# Patient Record
Sex: FEMALE | Race: WHITE | NOT HISPANIC OR LATINO | URBAN - METROPOLITAN AREA
[De-identification: names, ages, dates, MRNs, and addresses within clinical notes are randomized per-mention and may not be internally consistent; named-entity substitution may affect disease eponyms.]

---

## 2017-03-24 ENCOUNTER — TELEPHONE (OUTPATIENT)
Dept: FAMILY MEDICINE | Facility: CLINIC | Age: 57
End: 2017-03-24

## 2017-03-24 NOTE — LETTER
[facility]  [address] [facility] [address] [phone]          April 10, 2017    Mariana Feliz  [address] [facility] [address]    Dear Mariana,        Our records indicate that you have not scheduled for a(n)mammogram which was recommended by your health care team. Monitoring and managing your preventative and chronic health conditions are very important to us.       If you have received your health care elsewhere, please provide us with that information so it can be documented in your chart.    Please call [phone] or message us through your "[employer]" account to schedule an appointment or provide information for your chart.     I look forward to seeing you and working with you on your health care needs.     Sincerely,       YUSUF Oconnell /bautista        *If you have already scheduled an appointment, please disregard this reminder

## 2017-04-20 ENCOUNTER — RADIANT APPOINTMENT (OUTPATIENT)
Dept: GENERAL RADIOLOGY | Facility: CLINIC | Age: 57
End: 2017-04-20
Attending: FAMILY MEDICINE
Payer: COMMERCIAL

## 2017-04-20 ENCOUNTER — OFFICE VISIT (OUTPATIENT)
Dept: FAMILY MEDICINE | Facility: CLINIC | Age: 57
End: 2017-04-20
Payer: COMMERCIAL

## 2017-04-20 VITALS
HEART RATE: 83 BPM | BODY MASS INDEX: 21.46 KG/M2 | WEIGHT: 125 LBS | TEMPERATURE: 97.4 F | RESPIRATION RATE: 16 BRPM | OXYGEN SATURATION: 100 % | SYSTOLIC BLOOD PRESSURE: 131 MMHG | DIASTOLIC BLOOD PRESSURE: 78 MMHG

## 2017-04-20 DIAGNOSIS — M79.644 PAIN OF RIGHT THUMB: Primary | ICD-10-CM

## 2017-04-20 PROCEDURE — 73140 X-RAY EXAM OF FINGER(S): CPT | Mod: RT

## 2017-04-20 PROCEDURE — 99213 OFFICE O/P EST LOW 20 MIN: CPT | Performed by: FAMILY MEDICINE

## 2017-04-20 RX ORDER — IBUPROFEN 200 MG
200 TABLET ORAL EVERY 4 HOURS PRN
COMMUNITY
End: 2017-04-28

## 2017-04-20 ASSESSMENT — PAIN SCALES - GENERAL: PAINLEVEL: EXTREME PAIN (8)

## 2017-04-20 NOTE — MR AVS SNAPSHOT
After Visit Summary   4/20/2017    Mariana Feliz    MRN: [id number]           Patient Information     Date Of Birth          1960        Visit Information        Provider Department      4/20/2017 3:20 PM Beth Payne MD [facility]        Today's Diagnoses     Pain of right thumb    -  1       Follow-ups after your visit        Who to contact     If you have questions or need follow up information about today's clinic visit or your schedule please contact [facility] directly at [phone].  Normal or non-critical lab and imaging results will be communicated to you by MyChart, letter or phone within 4 business days after the clinic has received the results. If you do not hear from us within 7 days, please contact the clinic through Humedic[employer] or phone. If you have a critical or abnormal lab result, we will notify you by phone as soon as possible.  Submit refill requests through [employer] or call your pharmacy and they will forward the refill request to us. Please allow 3 business days for your refill to be completed.          Additional Information About Your Visit        MyChart Information     [employer] gives you secure access to your electronic health record. If you see a primary care provider, you can also send messages to your care team and make appointments. If you have questions, please call your primary care clinic.  If you do not have a primary care provider, please call [phone] and they will assist you.        Care EveryWhere ID     This is your Care EveryWhere ID. This could be used by other organizations to access your [place] medical records  [id number]        Your Vitals Were     Pulse Temperature Respirations Pulse Oximetry Breastfeeding? BMI (Body Mass Index)    83 97.4  F (36.3  C) (Oral) 16 100% No 21.46 kg/m2       Blood Pressure from Last 3 Encounters:   04/20/17 131/78   09/29/16 122/70   12/21/15 147/81    Weight from Last 3  Encounters:   04/20/17 125 lb (56.7 kg)   09/29/16 126 lb (57.2 kg)   12/21/15 123 lb (55.8 kg)              We Performed the Following     XR Finger Right G/E 2 Views        Primary Care Provider Office Phone # Fax #    Nilson Figueroa [phone]       [facility] [address] [facility] [address]        Thank you!     Thank you for choosing [facility]  for your care. Our goal is always to provide you with excellent care. Hearing back from our patients is one way we can continue to improve our services. Please take a few minutes to complete the written survey that you may receive in the mail after your visit with us. Thank you!             Your Updated Medication List - Protect others around you: Learn how to safely use, store and throw away your medicines at www.disposemymeds.org.          This list is accurate as of: 4/20/17  3:20 PM.  Always use your most recent med list.                   Brand Name Dispense Instructions for use    ibuprofen 200 MG tablet    ADVIL/MOTRIN     Take 200 mg by mouth every 4 hours as needed for mild pain       NO ACTIVE MEDICATIONS      .

## 2017-04-20 NOTE — LETTER
[facility]  [address] [facility] [address]  Phone: [phone]    April 20, 2017        Mariana Feliz  [address] [facility] [address]          To whom it may concern:    RE: Mariana Feliz    Patient was seen and treated today at our clinic and missed work.  Recommend left handed work only for a week.  April 28 may resume full duty if pain is resolved. If pain persists need re-evaluation.     Please contact me for questions or concerns.      Sincerely,        Beth Payne MD

## 2017-04-20 NOTE — PROGRESS NOTES
SUBJECTIVE:                                                    Mariana Feliz is a 57 year old female who presents to clinic today for the following health issues:      Musculoskeletal problem/pain      Duration: 1 week    Description  Location: right thumb     Intensity:  severe    Accompanying signs and symptoms: numbness and swelling    History  Previous similar problem: no   Previous evaluation:  none    Precipitating or alleviating factors:  Trauma or overuse: YES- overuse?  Aggravating factors include: overuse    Therapies tried and outcome: Ibuprofen   Recently lost mom a month ago. Coping ok with grief. No thoughts of harming self or others   Good social support.   A week ago thought it was a little tender  However past few days patient works in [employer] and was using it  A lot  3 days ago patient tried using a splint hasn't helped much  When patient  or button her pants would have shooting pain in her right base of the thumb up to medial wrist  Feels tight and swelling  No injuries  No recent fall  Denies any history of ulcers or kidney disease or any known contraindication to NSAIDs    Problem list and histories reviewed & adjusted, as indicated.  Additional history: as documented    Problem list, Medication list, Allergies, and Medical/Social/Surgical histories reviewed in EPIC and updated as appropriate.    ROS:  Constitutional, HEENT, cardiovascular, pulmonary, gi and gu systems are negative, except as otherwise noted.    OBJECTIVE:                                                    /78  Pulse 83  Temp 97.4  F (36.3  C) (Oral)  Resp 16  Wt 125 lb (56.7 kg)  SpO2 100%  Breastfeeding? No  BMI 21.46 kg/m2  Body mass index is 21.46 kg/(m^2).  Awake alert not in any acute cardiorespiratory distress  Psych: pleasant   Neurologic: No gross neurologic deficits  Skin: no obvious lesions or rashes  Musculoskeletal:   Affected extremity neurovascularly intact, no cyanosis or edema, good  pulses and capillary refill.   Some bony prominence on right thumb mcp joint area where she is tender and increased pain with range of motion.  No other bony pain or tenderness nontender scaphoid. Negative Finkelstein test.     Diagnostic Test Results:  Results for orders placed or performed in visit on 04/20/17 (from the past 24 hour(s))   XR Finger Right G/E 2 Views    Narrative    RIGHT FINGER TWO OR MORE VIEWS  4/20/2017 3:35 PM      HISTORY: Pain in right finger(s).     COMPARISON: None.      Impression    IMPRESSION: Three views of the right thumb. No acute fracture or  dislocation.    CHEMA PUGA MD        ASSESSMENT/PLAN:                                                        ICD-10-CM    1. Pain of right thumb M79.644 XR Finger Right G/E 2 Views     xrays were reviewed by me as well.   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   Patient was given thumb spica splint  Activity modifications discussed  Recommend follow up with primary care provider if no relief, sooner if worse  Adverse reactions of medications discussed.  Over the counter medications discussed.   Aware to come back in if with worsening symptoms or if no relief despite treatment plan  Patient voiced understanding and had no further questions.     MD Beth Savage MD  [facility]

## 2017-04-27 ENCOUNTER — TELEPHONE (OUTPATIENT)
Dept: FAMILY MEDICINE | Facility: CLINIC | Age: 57
End: 2017-04-27

## 2017-04-28 ENCOUNTER — OFFICE VISIT (OUTPATIENT)
Dept: FAMILY MEDICINE | Facility: CLINIC | Age: 57
End: 2017-04-28
Payer: COMMERCIAL

## 2017-04-28 VITALS
TEMPERATURE: 97.3 F | HEART RATE: 88 BPM | SYSTOLIC BLOOD PRESSURE: 135 MMHG | DIASTOLIC BLOOD PRESSURE: 86 MMHG | BODY MASS INDEX: 21.28 KG/M2 | WEIGHT: 124 LBS

## 2017-04-28 DIAGNOSIS — M18.9 ARTHROSIS OF FIRST CARPOMETACARPAL JOINT: Primary | ICD-10-CM

## 2017-04-28 PROCEDURE — 99213 OFFICE O/P EST LOW 20 MIN: CPT | Performed by: FAMILY MEDICINE

## 2017-04-28 NOTE — NURSING NOTE
"Chief Complaint   Patient presents with     Pain     right thumb x 2 weeks did see Elmer       Initial /88 (BP Location: Left arm, Cuff Size: Adult Regular)  Pulse 88  Temp 97.3  F (36.3  C) (Oral)  Wt 124 lb (56.2 kg)  BMI 21.28 kg/m2 Estimated body mass index is 21.28 kg/(m^2) as calculated from the following:    Height as of 9/29/16: 5' 4\" (1.626 m).    Weight as of this encounter: 124 lb (56.2 kg).  Medication Reconciliation: complete   Hayde Rosas M.A.      "

## 2017-04-28 NOTE — MR AVS SNAPSHOT
After Visit Summary   4/28/2017    Mariana Feliz    MRN: [id number]           Patient Information     Date Of Birth          1960        Visit Information        Provider Department      4/28/2017 9:30 AM Nilson Figueroa MD [facility]        Today's Diagnoses     Arthrosis of first carpometacarpal joint    -  1      Care Instructions    Your provider has referred you to: G: [facility] [phone]   [email or web address]/    Dr Shelbie Fine in Hand Surgery        Follow-ups after your visit        Additional Services     ORTHOPEDICS ADULT REFERRAL       Your provider has referred you to: FMG: [facility] [phone]   [email or web address]/    Please be aware that coverage of these services is subject to the terms and limitations of your health insurance plan.  Call member services at your health plan with any benefit or coverage questions.      Please bring the following to your appointment:    >>   Any x-rays, CTs or MRIs which have been performed.  Contact the facility where they were done to arrange for  prior to your scheduled appointment.    >>   List of current medications   >>   This referral request   >>   Any documents/labs given to you for this referral                  Your next 10 appointments already scheduled     May 25, 2017 10:00 AM CDT   MA SCREENING DIGITAL BILATERAL with ANDMA1   [facility] ([facility])    [address] [facility] [address] [phone]           Do not use any powder, lotion or deodorant under your arms or on your breast. If you do, we will ask you to remove it before your exam.  Wear comfortable, two-piece clothing.  If you have any  allergies, tell your care team.  Bring any previous mammograms from other facilities or have them mailed to the breast center.            Cruzito 15, 2017  9:20 AM CDT   PHYSICAL with Shelbie Merrill PA-C   [facility] ([facility]    [address] [facility] [address] [phone]              Who to contact     If you have questions or need follow up information about today's clinic visit or your schedule please contact [facility] directly at [phone].  Normal or non-critical lab and imaging results will be communicated to you by P[employer]apehart, letter or phone within 4 business days after the clinic has received the results. If you do not hear from us within 7 days, please contact the clinic through [employer] or phone. If you have a critical or abnormal lab result, we will notify you by phone as soon as possible.  Submit refill requests through [employer] or call your pharmacy and they will forward the refill request to us. Please allow 3 business days for your refill to be completed.          Additional Information About Your Visit        Pinshapehart Information     [employer] gives you secure access to your electronic health record. If you see a primary care provider, you can also send messages to your care team and make appointments. If you have questions, please call your primary care clinic.  If you do not have a primary care provider, please call [phone] and they will assist you.        Care EveryWhere ID     This is your Care EveryWhere ID. This could be used by other organizations to access your [place] medical records  [id number]        Your Vitals Were     Pulse Temperature BMI (Body Mass Index)             88 97.3  F (36.3  C) (Oral) 21.28 kg/m2          Blood Pressure from Last 3 Encounters:   04/28/17 143/88   04/20/17 131/78   09/29/16 122/70    Weight from Last 3 Encounters:   04/28/17 124 lb (56.2 kg)   04/20/17 125 lb (56.7 kg)   09/29/16 126 lb  (57.2 kg)              We Performed the Following     ORTHOPEDICS ADULT REFERRAL        Primary Care Provider Office Phone # Fax #    Nilson Figueroa [phone]       [facility] [address] [facility] [address]        Thank you!     Thank you for choosing [facility]  for your care. Our goal is always to provide you with excellent care. Hearing back from our patients is one way we can continue to improve our services. Please take a few minutes to complete the written survey that you may receive in the mail after your visit with us. Thank you!             Your Updated Medication List - Protect others around you: Learn how to safely use, store and throw away your medicines at www.disposemymeds.org.          This list is accurate as of: 4/28/17 10:02 AM.  Always use your most recent med list.                   Brand Name Dispense Instructions for use    NO ACTIVE MEDICATIONS      .

## 2017-04-28 NOTE — TELEPHONE ENCOUNTER
Notified Hayde received forms for patient and placed in provider in basket. Provider please see forms./Stefani Lewis,

## 2017-04-28 NOTE — TELEPHONE ENCOUNTER
Hayde from short term is calling to see if pcp has received the forms for patient, she stated she will refax this to pcp and requesting this be faxed back asap  Thank you

## 2017-04-28 NOTE — PATIENT INSTRUCTIONS
Your provider has referred you to: FMG: [facility] [phone]   [email or web address]    Dr Shelbie Fine in Hand Surgery

## 2017-04-28 NOTE — PROGRESS NOTES
SUBJECTIVE:  Mariana Feliz is a 57 year old female who is seen in follow-up for  right thumb pain that started   2-3  weeks ago. The onset of the pain was  gradual.    The pain was first noticed while the patient was sometime after she had to clean her mothers apartment and box up her stuff after she passed away.   She is right handed    The patient reports that these symptoms have been waxing and waning since that time  Palliative factors for the pain include:  Rest   Provacative factors include: closing the hand with te thumb against the 1st finger.     The patient relates no prior history of problems in this hand.  The patient reports that she is employed as a  at [employer] and does have a physical job that demands use of the affected hand.  The patient reports that she does not play sports .  The patients past medical, surgical, social and family histories were reviewed.  Social History     Social History     Marital status:      Spouse name: N/A     Number of children: N/A     Years of education: N/A     Social History Main Topics     Smoking status: Current Every Day Smoker     Packs/day: 1.00     Types: Cigarettes     Smokeless tobacco: Never Used     Alcohol use No     Drug use: No     Sexual activity: Yes     Partners: Male     Birth control/ protection: None     Other Topics Concern     Parent/Sibling W/ Cabg, Mi Or Angioplasty Before 65f 55m? No     Social History Narrative         REVIEW OF SYSTEMS:  CONSTITUTIONAL:NEGATIVE for fever, chills, change in weight  I    OBJECTIVE:  /86  Pulse 88  Temp 97.3  F (36.3  C) (Oral)  Wt 124 lb (56.2 kg)  BMI 21.28 kg/m2    RIGHT HAND EXAM:  GENERAL APPEARANCE: healthy, alert and no distress  SKIN: no suspicious lesions or rashes  NEURO: Normal strength and tone, mentation intact and speech normal  PSYCH:  mentation appears normal and affect normal/bright    Inspection:Thumb:  Prominence at the 1stCMC joint  Tender: Thumb:    CMC  Non-tender: the 1st dorsal compartment    Strength: full strength  Special tests: negative bobestein            ASSESSMENT/PLAN  (M18.9) Arthrosis of first carpometacarpal joint  (primary encounter diagnosis)  Comment:   Plan: ORTHOPEDICS ADULT REFERRAL            She will get me some forms for her work.

## 2017-05-01 ENCOUNTER — MYC MEDICAL ADVICE (OUTPATIENT)
Dept: FAMILY MEDICINE | Facility: CLINIC | Age: 57
End: 2017-05-01

## 2017-05-01 ENCOUNTER — TELEPHONE (OUTPATIENT)
Dept: FAMILY MEDICINE | Facility: CLINIC | Age: 57
End: 2017-05-01

## 2017-05-01 NOTE — TELEPHONE ENCOUNTER
Form faxed with the cover sheet patient dropped off. Faxed to [phone].  Marilin Anderson cma

## 2017-05-01 NOTE — TELEPHONE ENCOUNTER
Patient dropped of note at the .    I called to make an appointment with Shelbie Fine at [place]. The earliest she could see me was June 19th.  I scheduled it, but was wondering if you knew of another hand surgeon who was good at this. If so, message me in the my chart.  Thanks,  Mariana Feliz

## 2017-05-04 ENCOUNTER — MYC MEDICAL ADVICE (OUTPATIENT)
Dept: FAMILY MEDICINE | Facility: CLINIC | Age: 57
End: 2017-05-04

## 2017-05-08 ENCOUNTER — TELEPHONE (OUTPATIENT)
Dept: FAMILY MEDICINE | Facility: CLINIC | Age: 57
End: 2017-05-08

## 2017-05-08 NOTE — TELEPHONE ENCOUNTER
Reason for Call:  Form, our goal is to have forms completed with 72 hours, however, some forms may require a visit or additional information.    Type of letter, form or note:  disability    Who is the form from?: Patient    Where did the form come from: Patient or family brought in       What clinic location was the form placed at?: [place]    Where the form was placed: 's Box    What number is listed as a contact on the form?: [phone]       Additional comments: HIGH PRIORITY- please call the patient as soon as this has been done for   Call taken on 5/8/2017 at 8:37 AM by Niurka Humphrey

## 2017-05-08 NOTE — PROGRESS NOTES
Answers for HPI/ROS submitted by the patient on 6/8/2017   Annual Exam:  Getting at least 3 servings of Calcium per day:: NO  Bi-annual eye exam:: NO  Dental care twice a year:: NO  Sleep apnea or symptoms of sleep apnea:: None  Diet:: Regular (no restrictions)  Frequency of exercise:: 1 day/week  Taking medications regularly:: Not Applicable  Medication side effects:: Not applicable  Additional concerns today:: No  PHQ-2 Score: 3  Duration of exercise:: 15-30 minutes  If you checked off any problems, how difficult have these problems made it for you to do your work, take care of things at home, or get along with other people?: Not difficult at all  PHQ9 TOTAL SCORE: 5     SUBJECTIVE:     CC: Mariana Feliz is an 57 year old woman who presents for preventive health visit.     Healthy Habits:    Do you get at least three servings of calcium containing foods daily (dairy, green leafy vegetables, etc.)? No    Amount of exercise or daily activities, outside of work: 1 day(s) per week    Problems taking medications regularly No    Medication side effects: No    Have you had an eye exam in the past two years? no    Do you see a dentist twice per year? no  Do you have sleep apnea, excessive snoring or daytime drowsiness?no      Up to date on mammo, just had and was normal.   Due for fasting labs. Will get today.   Due for pap today.   Colon screening is due -she prefers fit card.   Not on any daily medications.   No vaginal bleeding. Menopause at 48.       Daily smoker.   bmi 21.     phq9 is elevated-will return to discuss.   Denies suicidal or homicidal thoughts.  Patient instructed to go to the emergency room or call 911 if these occur.                  Today's PHQ-2 Score:   PHQ-2 ( 1999 [employer]) 6/8/2017 9/29/2016   Q1: Little interest or pleasure in doing things 2 0   Q2: Feeling down, depressed or hopeless 1 0   PHQ-2 Score 3 0   Q1: Little interest or pleasure in doing things More than half the days -   Q2: Feeling  down, depressed or hopeless Several days -   PHQ-2 Score 3 -       Abuse: Current or Past(Physical, Sexual or Emotional)- No  Do you feel safe in your environment - Yes    Social History   Substance Use Topics     Smoking status: Current Every Day Smoker     Packs/day: 1.00     Years: 20.00     Types: Cigarettes     Smokeless tobacco: Never Used     Alcohol use No     The patient does not drink >3 drinks per day nor >7 drinks per week.    No results for input(s): CHOL, HDL, LDL, TRIG, CHOLHDLRATIO, NHDL in the last 03700 hours.    Reviewed orders with patient.  Reviewed health maintenance and updated orders accordingly - Yes    Mammo Decision Support:  Patient over age 50, mutual decision to screen reflected in health maintenance.    Pertinent mammograms are reviewed under the imaging tab.  History of abnormal Pap smear: NO - age 30- 65 PAP every 3 years recommended    Reviewed and updated as needed this visit by clinical staff  Tobacco  Allergies  Meds  Med Hx  Surg Hx  Fam Hx  Soc Hx        Reviewed and updated as needed this visit by Provider        Past Medical History:   Diagnosis Date     Arthritis     possibly the start of     MVP (mitral valve prolapse) 9/10/2013    Take medication pre procedures      NO ACTIVE PROBLEMS      Tobacco use disorder 9/10/2013      Past Surgical History:   Procedure Laterality Date     DENTAL SURGERY      wisdom tooth extraction     ORTHOPEDIC SURGERY      bunion surgery/ right foot     Obstetric History       T0      L0     SAB0   TAB0   Ectopic0   Multiple0   Live Births0       # Outcome Date GA Lbr Bravo/2nd Weight Sex Delivery Anes PTL Lv   2 Para            1 Para                   ROS:  C: NEGATIVE for fever, chills, change in weight  I: NEGATIVE for worrisome rashes, moles or lesions  E: NEGATIVE for vision changes or irritation  ENT: NEGATIVE for ear, mouth and throat problems  R: NEGATIVE for significant cough or SOB  B: NEGATIVE for masses,  "tenderness or discharge  CV: NEGATIVE for chest pain, palpitations or peripheral edema  GI: NEGATIVE for nausea, abdominal pain, heartburn, or change in bowel habits  : NEGATIVE for unusual urinary or vaginal symptoms. No vaginal bleeding.  M: NEGATIVE for significant arthralgias or myalgia  N: NEGATIVE for weakness, dizziness or paresthesias  P: NEGATIVE for changes in mood or affect     Problem list, Medication list, Allergies, and Medical/Social/Surgical histories reviewed in [facility] and updated as appropriate.  OBJECTIVE:     /78  Pulse 70  Temp 98  F (36.7  C) (Oral)  Ht 5' 4\" (1.626 m)  Wt 127 lb (57.6 kg)  SpO2 98%  Breastfeeding? No  BMI 21.8 kg/m2  EXAM:  GENERAL APPEARANCE: healthy, alert and no distress  EYES: Eyes grossly normal to inspection, PERRL and conjunctivae and sclerae normal  HENT: ear canals and TM's normal, nose and mouth without ulcers or lesions, oropharynx clear and oral mucous membranes moist  NECK: no adenopathy, no asymmetry, masses, or scars and thyroid normal to palpation  RESP: lungs clear to auscultation - no rales, rhonchi or wheezes  BREAST: normal without masses, tenderness or nipple discharge and no palpable axillary masses or adenopathy. Discussed self breast exams also.   CV: regular rate and rhythm, normal S1 S2, no S3 or S4, no murmur, click or rub, no peripheral edema and peripheral pulses strong  ABDOMEN: soft, nontender, no hepatosplenomegaly, no masses and bowel sounds normal   (female): normal female external genitalia, normal urethral meatus, vaginal mucosal atrophy noted, normal cervix, adnexae, and uterus without masses or abnormal discharge  MS: no musculoskeletal defects are noted and gait is age appropriate without ataxia  SKIN: no suspicious lesions or rashes  NEURO: Normal strength and tone, sensory exam grossly normal, mentation intact and speech normal  PSYCH: mentation appears normal and affect normal/bright    ASSESSMENT/PLAN:     1. Encounter " "for routine adult health examination with abnormal findings      2. Tobacco use disorder  Not a good time to quit per patient but she has been thinking about trying in the future      - CBC with platelets differential  - UA reflex to Microscopic    3. Screening for diabetes mellitus    - Basic metabolic panel    4. Screening cholesterol level    - Lipid panel reflex to direct LDL    5. Screening for malignant neoplasm of cervix    - Pap imaged thin layer screen with HPV - recommended age 30 - 65 years (select HPV order below)  - HPV High Risk Types DNA Cervical    6. Special screening for malignant neoplasms, colon    - Fecal colorectal cancer screen FIT; Future    COUNSELING:   Reviewed preventive health counseling, as reflected in patient instructions       Regular exercise       Healthy diet/nutrition       Vision screening       Hearing screening         reports that she has been smoking Cigarettes.  She has a 20.00 pack-year smoking history. She has never used smokeless tobacco.  Tobacco Cessation Action Plan: Information offered: Patient not interested at this time  Estimated body mass index is 21.8 kg/(m^2) as calculated from the following:    Height as of this encounter: 5' 4\" (1.626 m).    Weight as of this encounter: 127 lb (57.6 kg).       Counseling Resources:  ATP IV Guidelines  Pooled Cohorts Equation Calculator  Breast Cancer Risk Calculator  FRAX Risk Assessment  ICSI Preventive Guidelines  Dietary Guidelines for Americans, 2010  [employer] MyPlate  ASA Prophylaxis  Lung CA Screening    Patient Instructions     Preventive Health Recommendations  Female Ages 50 - 64    Yearly exam: See your health care provider every year in order to  o Review health changes.   o Discuss preventive care.    o Review your medicines if your doctor has prescribed any.      Get a Pap test every three years (unless you have an abnormal result and your provider advises testing more often).    If you get Pap tests with HPV test, " you only need to test every 5 years, unless you have an abnormal result.     You do not need a Pap test if your uterus was removed (hysterectomy) and you have not had cancer.    You should be tested each year for STDs (sexually transmitted diseases) if you're at risk.     Have a mammogram every 1 to 2 years.    Have a colonoscopy at age 50, or have a yearly FIT test (stool test). These exams screen for colon cancer.      Have a cholesterol test every 5 years, or more often if advised.    Have a diabetes test (fasting glucose) every three years. If you are at risk for diabetes, you should have this test more often.     If you are at risk for osteoporosis (brittle bone disease), think about having a bone density scan (DEXA).    Shots: Get a flu shot each year. Get a tetanus shot every 10 years.    Nutrition:     Eat at least 5 servings of fruits and vegetables each day.    Eat whole-grain bread, whole-wheat pasta and brown rice instead of white grains and rice.    Talk to your provider about Calcium and Vitamin D.     Lifestyle    Exercise at least 150 minutes a week (30 minutes a day, 5 days a week). This will help you control your weight and prevent disease.    Limit alcohol to one drink per day.    No smoking.     Wear sunscreen to prevent skin cancer.     See your dentist every six months for an exam and cleaning.    See your eye doctor every 1 to 2 years.          Shelbie Merrill PA-C  [facility]

## 2017-05-15 ENCOUNTER — TELEPHONE (OUTPATIENT)
Dept: FAMILY MEDICINE | Facility: CLINIC | Age: 57
End: 2017-05-15

## 2017-05-15 NOTE — LETTER
[facility]  [address] [facility] [address]  Phone: [phone]    05/15/17    Mariana Feliz  [address] [facility] [address]      To whom it may concern:     Our records indicate that you have not scheduled for a(n)mammogram and pap which was recommended by your health care team. Monitoring and managing your preventative and chronic health conditions are very important to us.     If you have received your health care elsewhere, please provide us with that information so it can be documented in your chart.    Please call [phone] or message us through your [facility] account to schedule an appointment or provide information for your chart.     I look forward to seeing you and working with you on your health care needs.       *If you have already scheduled an appointment, please disregard this reminder    Sincerely,      Nilson Figueroa MD

## 2017-05-15 NOTE — TELEPHONE ENCOUNTER
Panel Management Review      Patient has the following on her problem list: None      Composite cancer screening  Chart review shows that this patient is due/due soon for the following Pap Smear and Mammogram  Summary:    Patient is due/failing the following:   MAMMOGRAM and PAP    Action needed:   Patient needs referral/order: pap mammo    Type of outreach:    Sent letter.    Questions for provider review:    None                                                                                                                                    LISA Coreas   8:10 AM  5/15/2017         Chart routed to close .

## 2017-05-19 ENCOUNTER — PRE VISIT (OUTPATIENT)
Dept: ORTHOPEDICS | Facility: CLINIC | Age: 57
End: 2017-05-19

## 2017-05-19 NOTE — TELEPHONE ENCOUNTER
Pt has an appointment for right thumb pain that started approximately 4/13/17.  1. Do you have recent xrays (if not seen in EPIC)?Yes - Xray are in EPIC.  From 4/20/17  2. Do you have any MRI's (if not seen in EPIC)? No.   3. Have you had surgery in the past for the same issue?No.   4. Are you being referred by another provider? Yes: MD [place] ( [facility])  If yes-Records in Epic or being obtained by: in epic.  5. Is this work comp or MVA related?  No.  6. Did you have an EMG test? No.      Elijah Wisdom, RN

## 2017-05-25 ENCOUNTER — TELEPHONE (OUTPATIENT)
Dept: FAMILY MEDICINE | Facility: CLINIC | Age: 57
End: 2017-05-25

## 2017-05-25 ENCOUNTER — OFFICE VISIT (OUTPATIENT)
Dept: ORTHOPEDICS | Facility: CLINIC | Age: 57
End: 2017-05-25
Payer: COMMERCIAL

## 2017-05-25 VITALS — HEART RATE: 96 BPM | DIASTOLIC BLOOD PRESSURE: 89 MMHG | OXYGEN SATURATION: 99 % | SYSTOLIC BLOOD PRESSURE: 149 MMHG

## 2017-05-25 DIAGNOSIS — M18.11 PRIMARY OSTEOARTHRITIS OF FIRST CARPOMETACARPAL JOINT OF RIGHT HAND: Primary | ICD-10-CM

## 2017-05-25 PROCEDURE — 20600 DRAIN/INJ JOINT/BURSA W/O US: CPT | Mod: RT | Performed by: ORTHOPAEDIC SURGERY

## 2017-05-25 PROCEDURE — 99203 OFFICE O/P NEW LOW 30 MIN: CPT | Mod: 25 | Performed by: ORTHOPAEDIC SURGERY

## 2017-05-25 ASSESSMENT — PAIN SCALES - GENERAL: PAINLEVEL: MODERATE PAIN (4)

## 2017-05-25 NOTE — LETTER
May 25, 2017      RE: Mariana Feliz  [address] [facility] [address]       To whom it may concern:    Mariana Feliz was seen in our clinic today. Please excuse her from work through Tuesday, 5/30/17. She may return to work starting Wednesday, 5/31/17, without restrictions.       Sincerely,          Shelbie Jarvis MD

## 2017-05-25 NOTE — TELEPHONE ENCOUNTER
Reason for Call:  Form, our goal is to have forms completed with 72 hours, however, some forms may require a visit or additional information.    Type of letter, form or note:  Attending Physician's Statement of Work Capacity    Who is the form from?: Patient    Where did the form come from: Patient or family brought in       What clinic location was the form placed at?: [place]    Where the form was placed: [facility]    What number is listed as a contact on the form?: [phone]       Additional comments: none    Call taken on 5/25/2017 at 11:58 AM by Abbey Casillas

## 2017-05-25 NOTE — MR AVS SNAPSHOT
After Visit Summary   2017    Mariana Feliz    MRN: [id number]           Patient Information     Date Of Birth          1960        Visit Information        Provider Department      2017 10:00 AM Shelbie Jarvis MD [facility]        Today's Diagnoses     Primary osteoarthritis of first carpometacarpal joint of right hand    -  1      Care Instructions    Thanks for coming today.  [facility]  [address] [place] [address]    To schedule future appointments in Ortho Clinic, you may call [phone].    To schedule ordered imaging by your provider:   Call Central Imaging [phone]    To schedule an injection ordered by your provider:  Call Central Imaging Injection scheduling line: [phone]  [facility] available online at:  [email or web address]    Please call if any further questions or concerns ([phone]).  Clinic hours 8 am to 5 pm.    Return to clinic (call) if symptoms worsen or fail to improve.            Follow-ups after your visit        Additional Services     [facility] PT, HAND, AND CHIROPRACTIC REFERRAL       **This order will print in the [facility] Scheduling Office**    Physical Therapy, Hand Therapy and Chiropractic Care are available through:    *[facility]  *[facility]  *[facility]    Call one number to schedule at any of the above locations: [phone].    Your provider has referred you to: Hand Therapy    Indication/Reason for Referral: Right 1st CMC joint arthritis pain  Onset of Illness: unknown  Therapy Orders: Evaluate and Treat, CMC Brace (old one is not effective)  Special Programs: None  Special Request: None    Dick Jennings      Additional Comments for the Therapist or Chiropractor: n/a    Please be aware that coverage of these services is subject to the terms and limitations of your health insurance plan.  Call member services at your  health plan with any benefit or coverage questions.      Please bring the following to your appointment:    *Your personal calendar for scheduling future appointments  *Comfortable clothing                  Future tests that were ordered for you today     Open Future Orders        Priority Expected Expires Ordered    Fecal colorectal cancer screen FIT Routine 6/16/2017 7/31/2017 6/15/2017            Who to contact     If you have questions or need follow up information about today's clinic visit or your schedule please contact [facility] directly at [phone].  Normal or non-critical lab and imaging results will be communicated to you by [employer], letter or phone within 4 business days after the clinic has received the results. If you do not hear from us within 7 days, please contact the clinic through [employer] or phone. If you have a critical or abnormal lab result, we will notify you by phone as soon as possible.  Submit refill requests through [employer] or call your pharmacy and they will forward the refill request to us. Please allow 3 business days for your refill to be completed.          Additional Information About Your Visit        [employer] Information     [employer] gives you secure access to your electronic health record. If you see a primary care provider, you can also send messages to your care team and make appointments. If you have questions, please call your primary care clinic.  If you do not have a primary care provider, please call [phone] and they will assist you.      [employer] is an electronic gateway that provides easy, online access to your medical records. With [employer], you can request a clinic appointment, read your test results, renew a prescription or communicate with your care team.     To access your existing account, please contact your [facility] or call [phone] for assistance.        Care EveryWhere ID     This is your Care  EveryWhere ID. This could be used by other organizations to access your [facility] medical records  [id number]        Your Vitals Were     Pulse Pulse Oximetry                96 99%           Blood Pressure from Last 3 Encounters:   06/15/17 130/78   05/25/17 149/89   04/28/17 135/86    Weight from Last 3 Encounters:   06/15/17 57.6 kg (127 lb)   04/28/17 56.2 kg (124 lb)   04/20/17 56.7 kg (125 lb)              We Performed the Following     DRAIN/INJECT SMALL JOINT/BURSA     [facility] PT, HAND, AND CHIROPRACTIC REFERRAL     METHYLPREDNISOLONE 40 MG INJ        Primary Care Provider Office Phone # Fax #    Nilson Figueroa [phone]       [facility] [address] [facility] [address]        Thank you!     Thank you for choosing [facility]  for your care. Our goal is always to provide you with excellent care. Hearing back from our patients is one way we can continue to improve our services. Please take a few minutes to complete the written survey that you may receive in the mail after your visit with us. Thank you!             Your Updated Medication List - Protect others around you: Learn how to safely use, store and throw away your medicines at www.disposemymeds.org.          This list is accurate as of: 5/25/17 11:59 PM.  Always use your most recent med list.                   Brand Name Dispense Instructions for use    NO ACTIVE MEDICATIONS      .

## 2017-05-25 NOTE — PATIENT INSTRUCTIONS
Thanks for coming today.  [facility]  [address] [place] [address]    To schedule future appointments in Ortho Clinic, you may call [phone].    To schedule ordered imaging by your provider:   Call Central Imaging [phone]    To schedule an injection ordered by your provider:  Call Central Imaging Injection scheduling line: [phone]  [employer] available online at:  [email or web address]    Please call if any further questions or concerns ([phone]).  Clinic hours 8 am to 5 pm.    Return to clinic (call) if symptoms worsen or fail to improve.

## 2017-05-25 NOTE — NURSING NOTE
"Mariana Feliz's goals for this visit include: Arthrosis of right first CMC joint  She requests these members of her care team be copied on today's visit information: n/a    PCP: Nilson Figueroa    Referring Provider:  ESTABLISHED PATIENT  No address on file    Chief Complaint   Patient presents with     Consult     Arthrosis of right first CMC joint       Initial /89  Pulse 96  SpO2 99% Estimated body mass index is 21.28 kg/(m^2) as calculated from the following:    Height as of 9/29/16: 1.626 m (5' 4\").    Weight as of 4/28/17: 56.2 kg (124 lb).  Medication Reconciliation: complete    "

## 2017-05-25 NOTE — PROGRESS NOTES
[facility], Orthopaedic Hand Surgery    Mariana Feliz MRN# [id number]   Age: 57 year old YOB: 1960     Requesting physician: Established Patient   Primary care physician: Nilson Figueroa             History of Present Illness:   Mariana Feliz is a 57 year old year old female who presents today for evaluation and management of right thumb pain, present since April 2017. Helped her mom moved in April, frequent lifting and now has difficulty with key pinch activities.     Hand Dominance Evaluation  Hand Dominance: (P) Right    The pain localizes to the base of R thumb CMC.    The pain has been present for approximately 2mo.    There was inciting trauma with helping her mother move.    The pain has been Unchanged over the more recent history.  Depends on activity, gripping is painful.    Thus far, the patient has tried Tylenol and thumb spica splint.  Without relief of symptoms.  Brace irritated skin and was not helpful.            Past Medical History:     Patient Active Problem List   Diagnosis     CARDIOVASCULAR SCREENING; LDL GOAL LESS THAN 160     Advanced directives, counseling/discussion     MVP (mitral valve prolapse)     Tobacco use disorder     Arthrosis of first carpometacarpal joint     Past Medical History:   Diagnosis Date     Arthritis     possibly the start of     MVP (mitral valve prolapse) 9/10/2013    Take medication pre procedures      NO ACTIVE PROBLEMS      Tobacco use disorder 9/10/2013     Prior history of blood clot: none  Prior history of bleeding problems: none  Prior history of anesthetic complications: none  Currently on opioids:  none  History of Diabetes: no           Past Surgical History:     Past Surgical History:   Procedure Laterality Date     DENTAL SURGERY      wisdom tooth extraction     ORTHOPEDIC SURGERY  2013    bunion surgery/ right foot            Social History:     Social History     Social History     Marital status:      Spouse name: N/A      Number of children: N/A     Years of education: N/A     Occupational History     Not on file.     Social History Main Topics     Smoking status: Current Every Day Smoker     Packs/day: 1.00     Types: Cigarettes     Smokeless tobacco: Never Used     Alcohol use No     Drug use: No     Sexual activity: Yes     Partners: Male     Birth control/ protection: None     Other Topics Concern     Parent/Sibling W/ Cabg, Mi Or Angioplasty Before 65f 55m? No     Social History Narrative     The patient lives in [place] with  and son.  Works in [employer] for [employer], frequent lifting. Estimated 40-60 pound boxes.          Family History:       Family History   Problem Relation Age of Onset     Arthritis Mother      DIABETES Mother      Hypertension Mother      OSTEOPOROSIS Mother      Hypertension Brother      DIABETES Sister      CANCER Son      Hypertension Brother      Thyroid Disease Brother      Thyroid Disease Sister               Medications:     Current Outpatient Prescriptions   Medication Sig     NO ACTIVE MEDICATIONS .     No current facility-administered medications for this visit.             Review of Systems:   This was obtained on the medical intake form and is included in the medical chart.  Pertinent positives include those listed in the HPI.         Physical Exam:     EXAMINATION pertinent findings:   VITAL SIGNS: /89  Pulse 96  SpO2 99%  GEN: AOx3, appears stated age, cooperative, no distress  HEENT: normocephalic, atraumatic  RESP: non labored breathing   SKIN: No rashes or open lesions   CV: Non-tachycardic   NEURO: CN2-12 grossly intact   VASCULAR: palpable radial pulse   MUSCULOSKELETAL:     RUE exam:   Tenderness to palpation: R CMC base.  No MP joint hyperextension.  Warmth and erythema: no  Prior incision: no  Sensation: intact in Med/uln/rad distributions  Motor: Fires EPL/FPL/AIN/IO  Deformity: cmc prominence, + grind         Data:   Imaging:     XR of 3V R thumb reviewed and the  pertinent findings are as follows:   2mm bone spurs, minor joint narrowing at CMC. No acute findings    Pertinent Labs: none     Measurements   force  R hand  level 1 force: (P) 5 kg (11 lb 0.4 oz)  R hand  level 3 force: (P) 15 kg (33 lb 1.1 oz)  R hand  level 5 force: (P) 4 kg (8 lb 13.1 oz)  L hand   level 1 force: (P) 22 kg (48 lb 8 oz)  L hand  level 3 force: (P) 24 kg (52 lb 14.6 oz)  L hand  level 5 force: (P) 19 kg (41 lb 14.2 oz)    Pinch force  R hand key force: (P) 2.722 kg (6 lb)  R hand 3 pt force: (P) 0.907 kg (2 lb)  L hand key force: (P) 4.082 kg (9 lb)  L hand 3 pt force: (P) 2.722 kg (6 lb)    QuickDASH Assessment  QuickDASH Main 5/25/2017   1. Open a tight or new jar. 4   2. Do heavy household chores (e.g., wash walls, floors). 4   3. Carry a shopping bag or briefcase. 2   4. Wash your back. 3   5. Use a knife to cut food. 3   6. Recreational activities in which you take some force or impact through your arm, shoulder or hand (e.g., golf, hammering, tennis, etc.). 3   7. During the past week, to what extent has your arm, shoulder or hand problem interfered with your normal social activities with family, friends, neighbours or groups? 2   8. During the past week, were you limited in your work or other regular daily activities as a result of your arm, shoulder or hand problem? 5   9. Arm, shoulder or hand pain. 3   10. Tingling (pins and needles) in your arm,shoulder or hand. 2   11. During the past week, how much difficulty have you had sleeping because of the pain in your arm, shoulder or hand? ([place] number) 2   SUM: 33               Assessment and Plan:   Assessment:  1. R CMC arthritis     Plan:  1. Hand therapy referral  2. brace  3. CSI R 1st CMC joint    We discussed the treatment protocol for CMC arthritis.  I think that her incident in April flared her first CMC arthritis.  We should be able to get her back to baseline with the above interventions.  We  discussed her job.  There are no restrictions.  She may return to work next Tuesday with no restrictions.  She will follow-up as needed.    After written informed consent was obtained, the patient's right 1st cmc was prepped with chloraprep.  A combination of 20 mg of Depo-Medrol and 1cc 1% lidocaine were injected into the R 1st CMC.  There were no immediate complications.      The patient plan of care was formulated along with Dr. Simona Lee MD  Orthopedic Resident  05/25/2017    I have independently seen and evaluated the patient and agree with the findings and plan of care as documented by the resident and edited by me.

## 2017-05-26 ENCOUNTER — TELEPHONE (OUTPATIENT)
Dept: FAMILY MEDICINE | Facility: CLINIC | Age: 57
End: 2017-05-26

## 2017-05-26 ENCOUNTER — MYC MEDICAL ADVICE (OUTPATIENT)
Dept: ORTHOPEDICS | Facility: CLINIC | Age: 57
End: 2017-05-26

## 2017-05-26 NOTE — TELEPHONE ENCOUNTER
Per huddle with Dr. Jarvis, OK with workability letter stating the Pt can be out until Tuesday, 5/30/17, in order to allow the injection to work and swelling from the injection to dissipate.   The condition being treated is considered to be chronic and the Pt was instructed to follow up as needed.   Dr. Jarvis is not willing to fill out disability forms for this Pt as she is not the provider that placed the Pt on disability.  Replied to the Pt and advised regarding the workability letter and the disability forms.   Contacted  with Dr. Figueroa's office and relayed our plan.   Faxed workability letter to [employer].    Elijah Wisdom RN

## 2017-05-30 NOTE — TELEPHONE ENCOUNTER
Spoke to Elijah 5/26/17 he was going to have Dr. Jarvis write note for patient as she doesn't feel patient needs disability paperwork filled out. Elijah has been in contact with patient./Stefani Lewis,

## 2017-06-02 ENCOUNTER — MYC MEDICAL ADVICE (OUTPATIENT)
Dept: ORTHOPEDICS | Facility: CLINIC | Age: 57
End: 2017-06-02

## 2017-06-06 NOTE — TELEPHONE ENCOUNTER
Waiting for OV notes to be signed by provider, message sent to provider, replied to [employer] message.    Elijah Wisdom RN

## 2017-06-06 NOTE — TELEPHONE ENCOUNTER
6.6.17  Patient is calling regarding a [employer] message.  Was told to call Ortho and ask for Ange.  Please call her back. [phone]

## 2017-06-08 ENCOUNTER — RADIANT APPOINTMENT (OUTPATIENT)
Dept: MAMMOGRAPHY | Facility: CLINIC | Age: 57
End: 2017-06-08
Attending: PHYSICIAN ASSISTANT
Payer: COMMERCIAL

## 2017-06-08 DIAGNOSIS — Z12.31 VISIT FOR SCREENING MAMMOGRAM: ICD-10-CM

## 2017-06-08 PROCEDURE — G0202 SCR MAMMO BI INCL CAD: HCPCS | Mod: TC

## 2017-06-08 ASSESSMENT — PATIENT HEALTH QUESTIONNAIRE - PHQ9
SUM OF ALL RESPONSES TO PHQ QUESTIONS 1-9: 5
10. IF YOU CHECKED OFF ANY PROBLEMS, HOW DIFFICULT HAVE THESE PROBLEMS MADE IT FOR YOU TO DO YOUR WORK, TAKE CARE OF THINGS AT HOME, OR GET ALONG WITH OTHER PEOPLE: NOT DIFFICULT AT ALL
SUM OF ALL RESPONSES TO PHQ QUESTIONS 1-9: 5

## 2017-06-09 ENCOUNTER — MYC MEDICAL ADVICE (OUTPATIENT)
Dept: ORTHOPEDICS | Facility: CLINIC | Age: 57
End: 2017-06-09

## 2017-06-09 ASSESSMENT — PATIENT HEALTH QUESTIONNAIRE - PHQ9: SUM OF ALL RESPONSES TO PHQ QUESTIONS 1-9: 5

## 2017-06-14 ENCOUNTER — MYC MEDICAL ADVICE (OUTPATIENT)
Dept: ORTHOPEDICS | Facility: CLINIC | Age: 57
End: 2017-06-14

## 2017-06-15 ENCOUNTER — OFFICE VISIT (OUTPATIENT)
Dept: FAMILY MEDICINE | Facility: CLINIC | Age: 57
End: 2017-06-15
Payer: COMMERCIAL

## 2017-06-15 VITALS
DIASTOLIC BLOOD PRESSURE: 78 MMHG | OXYGEN SATURATION: 98 % | WEIGHT: 127 LBS | TEMPERATURE: 98 F | SYSTOLIC BLOOD PRESSURE: 130 MMHG | HEIGHT: 64 IN | BODY MASS INDEX: 21.68 KG/M2 | HEART RATE: 70 BPM

## 2017-06-15 DIAGNOSIS — Z12.11 SPECIAL SCREENING FOR MALIGNANT NEOPLASMS, COLON: ICD-10-CM

## 2017-06-15 DIAGNOSIS — Z12.4 SCREENING FOR MALIGNANT NEOPLASM OF CERVIX: ICD-10-CM

## 2017-06-15 DIAGNOSIS — Z13.220 SCREENING CHOLESTEROL LEVEL: ICD-10-CM

## 2017-06-15 DIAGNOSIS — F17.200 TOBACCO USE DISORDER: ICD-10-CM

## 2017-06-15 DIAGNOSIS — Z13.1 SCREENING FOR DIABETES MELLITUS: ICD-10-CM

## 2017-06-15 DIAGNOSIS — Z00.01 ENCOUNTER FOR ROUTINE ADULT HEALTH EXAMINATION WITH ABNORMAL FINDINGS: Primary | ICD-10-CM

## 2017-06-15 LAB
ALBUMIN UR-MCNC: NEGATIVE MG/DL
ANION GAP SERPL CALCULATED.3IONS-SCNC: 7 MMOL/L (ref 3–14)
APPEARANCE UR: CLEAR
BASOPHILS # BLD AUTO: 0 10E9/L (ref 0–0.2)
BASOPHILS NFR BLD AUTO: 0.5 %
BILIRUB UR QL STRIP: NEGATIVE
BUN SERPL-MCNC: 11 MG/DL (ref 7–30)
CALCIUM SERPL-MCNC: 8.8 MG/DL (ref 8.5–10.1)
CHLORIDE SERPL-SCNC: 110 MMOL/L (ref 94–109)
CHOLEST SERPL-MCNC: 225 MG/DL
CO2 SERPL-SCNC: 25 MMOL/L (ref 20–32)
COLOR UR AUTO: YELLOW
CREAT SERPL-MCNC: 0.74 MG/DL (ref 0.52–1.04)
DIFFERENTIAL METHOD BLD: NORMAL
EOSINOPHIL # BLD AUTO: 0.2 10E9/L (ref 0–0.7)
EOSINOPHIL NFR BLD AUTO: 2.1 %
ERYTHROCYTE [DISTWIDTH] IN BLOOD BY AUTOMATED COUNT: 13.2 % (ref 10–15)
GFR SERPL CREATININE-BSD FRML MDRD: 80 ML/MIN/1.7M2
GLUCOSE SERPL-MCNC: 82 MG/DL (ref 70–99)
GLUCOSE UR STRIP-MCNC: NEGATIVE MG/DL
HCT VFR BLD AUTO: 44.4 % (ref 35–47)
HDLC SERPL-MCNC: 56 MG/DL
HGB BLD-MCNC: 14.2 G/DL (ref 11.7–15.7)
HGB UR QL STRIP: ABNORMAL
KETONES UR STRIP-MCNC: NEGATIVE MG/DL
LDLC SERPL CALC-MCNC: 146 MG/DL
LEUKOCYTE ESTERASE UR QL STRIP: NEGATIVE
LYMPHOCYTES # BLD AUTO: 2 10E9/L (ref 0.8–5.3)
LYMPHOCYTES NFR BLD AUTO: 24.9 %
MCH RBC QN AUTO: 31 PG (ref 26.5–33)
MCHC RBC AUTO-ENTMCNC: 32 G/DL (ref 31.5–36.5)
MCV RBC AUTO: 97 FL (ref 78–100)
MONOCYTES # BLD AUTO: 0.7 10E9/L (ref 0–1.3)
MONOCYTES NFR BLD AUTO: 8.1 %
NEUTROPHILS # BLD AUTO: 5.3 10E9/L (ref 1.6–8.3)
NEUTROPHILS NFR BLD AUTO: 64.4 %
NITRATE UR QL: NEGATIVE
NON-SQ EPI CELLS #/AREA URNS LPF: NORMAL /LPF
NONHDLC SERPL-MCNC: 169 MG/DL
PH UR STRIP: 5.5 PH (ref 5–7)
PLATELET # BLD AUTO: 259 10E9/L (ref 150–450)
POTASSIUM SERPL-SCNC: 4.5 MMOL/L (ref 3.4–5.3)
RBC # BLD AUTO: 4.58 10E12/L (ref 3.8–5.2)
RBC #/AREA URNS AUTO: NORMAL /HPF (ref 0–2)
SODIUM SERPL-SCNC: 142 MMOL/L (ref 133–144)
SP GR UR STRIP: 1.02 (ref 1–1.03)
TRIGL SERPL-MCNC: 117 MG/DL
URN SPEC COLLECT METH UR: ABNORMAL
UROBILINOGEN UR STRIP-ACNC: 0.2 EU/DL (ref 0.2–1)
WBC # BLD AUTO: 8.2 10E9/L (ref 4–11)
WBC #/AREA URNS AUTO: NORMAL /HPF (ref 0–2)

## 2017-06-15 PROCEDURE — 85025 COMPLETE CBC W/AUTO DIFF WBC: CPT | Performed by: PHYSICIAN ASSISTANT

## 2017-06-15 PROCEDURE — 80048 BASIC METABOLIC PNL TOTAL CA: CPT | Performed by: PHYSICIAN ASSISTANT

## 2017-06-15 PROCEDURE — 87624 HPV HI-RISK TYP POOLED RSLT: CPT | Performed by: PHYSICIAN ASSISTANT

## 2017-06-15 PROCEDURE — G0145 SCR C/V CYTO,THINLAYER,RESCR: HCPCS | Performed by: PHYSICIAN ASSISTANT

## 2017-06-15 PROCEDURE — 36415 COLL VENOUS BLD VENIPUNCTURE: CPT | Performed by: PHYSICIAN ASSISTANT

## 2017-06-15 PROCEDURE — 99396 PREV VISIT EST AGE 40-64: CPT | Performed by: PHYSICIAN ASSISTANT

## 2017-06-15 PROCEDURE — 81001 URINALYSIS AUTO W/SCOPE: CPT | Performed by: PHYSICIAN ASSISTANT

## 2017-06-15 PROCEDURE — 80061 LIPID PANEL: CPT | Performed by: PHYSICIAN ASSISTANT

## 2017-06-15 NOTE — MR AVS SNAPSHOT
After Visit Summary   6/15/2017    Mariana Feliz    MRN: [id number]           Patient Information     Date Of Birth          1960        Visit Information        Provider Department      6/15/2017 9:20 AM Shelbie Merrill PA-C [facility]        Today's Diagnoses     Encounter for routine adult health examination with abnormal findings    -  1    Tobacco use disorder        Screening for diabetes mellitus        Screening cholesterol level        Screening for malignant neoplasm of cervix        Special screening for malignant neoplasms, colon          Care Instructions      Preventive Health Recommendations  Female Ages 50 - 64    Yearly exam: See your health care provider every year in order to  o Review health changes.   o Discuss preventive care.    o Review your medicines if your doctor has prescribed any.      Get a Pap test every three years (unless you have an abnormal result and your provider advises testing more often).    If you get Pap tests with HPV test, you only need to test every 5 years, unless you have an abnormal result.     You do not need a Pap test if your uterus was removed (hysterectomy) and you have not had cancer.    You should be tested each year for STDs (sexually transmitted diseases) if you're at risk.     Have a mammogram every 1 to 2 years.    Have a colonoscopy at age 50, or have a yearly FIT test (stool test). These exams screen for colon cancer.      Have a cholesterol test every 5 years, or more often if advised.    Have a diabetes test (fasting glucose) every three years. If you are at risk for diabetes, you should have this test more often.     If you are at risk for osteoporosis (brittle bone disease), think about having a bone density scan (DEXA).    Shots: Get a flu shot each year. Get a tetanus shot every 10 years.    Nutrition:     Eat at least 5 servings of fruits and vegetables each day.    Eat whole-grain bread, whole-wheat pasta  and brown rice instead of white grains and rice.    Talk to your provider about Calcium and Vitamin D.     Lifestyle    Exercise at least 150 minutes a week (30 minutes a day, 5 days a week). This will help you control your weight and prevent disease.    Limit alcohol to one drink per day.    No smoking.     Wear sunscreen to prevent skin cancer.     See your dentist every six months for an exam and cleaning.    See your eye doctor every 1 to 2 years.            Follow-ups after your visit        Future tests that were ordered for you today     Open Future Orders        Priority Expected Expires Ordered    Fecal colorectal cancer screen FIT Routine 6/16/2017 7/31/2017 6/15/2017            Who to contact     If you have questions or need follow up information about today's clinic visit or your schedule please contact [facility] directly at [phone].  Normal or non-critical lab and imaging results will be communicated to you by [employer], letter or phone within 4 business days after the clinic has received the results. If you do not hear from us within 7 days, please contact the clinic through [employer] or phone. If you have a critical or abnormal lab result, we will notify you by phone as soon as possible.  Submit refill requests through [employer]y or call your pharmacy and they will forward the refill request to us. Please allow 3 business days for your refill to be completed.          Additional Information About Your Visit        ipsy Information     ipsy gives you secure access to your electronic health record. If you see a primary care provider, you can also send messages to your care team and make appointments. If you have questions, please call your primary care clinic.  If you do not have a primary care provider, please call [phone] and they will assist you.        Care EveryWhere ID     This is your Care EveryWhere ID. This could be used by other organizations to access your [place]  "medical records  [id number]        Your Vitals Were     Pulse Temperature Height Pulse Oximetry Breastfeeding? BMI (Body Mass Index)    70 98  F (36.7  C) (Oral) 5' 4\" (1.626 m) 98% No 21.8 kg/m2       Blood Pressure from Last 3 Encounters:   06/15/17 130/78   05/25/17 149/89   04/28/17 135/86    Weight from Last 3 Encounters:   06/15/17 127 lb (57.6 kg)   04/28/17 124 lb (56.2 kg)   04/20/17 125 lb (56.7 kg)              We Performed the Following     Basic metabolic panel     CBC with platelets differential     HPV High Risk Types DNA Cervical     Lipid panel reflex to direct LDL     Pap imaged thin layer screen with HPV - recommended age 30 - 65 years (select HPV order below)     UA reflex to Microscopic        Primary Care Provider Office Phone # Fax #    Nilson Figueroa [phone]       [facility] [address] [facility] [address]        Thank you!     Thank you for choosing [facility]  for your care. Our goal is always to provide you with excellent care. Hearing back from our patients is one way we can continue to improve our services. Please take a few minutes to complete the written survey that you may receive in the mail after your visit with us. Thank you!             Your Updated Medication List - Protect others around you: Learn how to safely use, store and throw away your medicines at www.disposemymeds.org.          This list is accurate as of: 6/15/17  9:59 AM.  Always use your most recent med list.                   Brand Name Dispense Instructions for use    NO ACTIVE MEDICATIONS      .         "

## 2017-06-15 NOTE — NURSING NOTE
"Chief Complaint   Patient presents with     Physical     AFE, Pt is fasting today       Initial /78  Pulse 70  Temp 98  F (36.7  C) (Oral)  Ht 5' 4\" (1.626 m)  Wt 127 lb (57.6 kg)  SpO2 98%  Breastfeeding? No  BMI 21.8 kg/m2 Estimated body mass index is 21.8 kg/(m^2) as calculated from the following:    Height as of this encounter: 5' 4\" (1.626 m).    Weight as of this encounter: 127 lb (57.6 kg).  Medication Reconciliation: complete      Rogerio Beatty MA    "

## 2017-06-18 NOTE — PROGRESS NOTES
"Alberta Peña,       Your recent test results are attached, if you have any questions or concerns please feel free to contact me via e-mail or call [phone].  Kidney function is normal, blood sugar normal no diabetes. White and red blood cell counts normal, no anemia. Urine is normal, no blood seen on microscopic exam. Your LDL or \"bad\" cholesterol was high at 146.  Your HDL or \"good\" cholesterol was normal at 56, the higher this number the better.  Your triglycerides or \"fatty acids\" were normal/to goal.  To improve this,  I would recommend exercising for at least 30 minutes 5 times a week or for 50 minutes 3 times a week.  Brisk walking counts for this, it does not need to be vigorous exercise.  Also a diet high in vegetables, fruits, fiber, and whole grains will help.      We will re-check your cholesterol in 12 months to assess your progress.         It was a pleasure to see you at your recent office visit.      Sincerely,  Shelbie Merrill PA-C  "

## 2017-06-20 LAB
COPATH REPORT: NORMAL
PAP: NORMAL

## 2017-06-22 LAB
FINAL DIAGNOSIS: NORMAL
HPV HR 12 DNA CVX QL NAA+PROBE: NEGATIVE
HPV16 DNA SPEC QL NAA+PROBE: NEGATIVE
HPV18 DNA SPEC QL NAA+PROBE: NEGATIVE
SPECIMEN DESCRIPTION: NORMAL

## 2017-06-29 ENCOUNTER — OFFICE VISIT (OUTPATIENT)
Dept: FAMILY MEDICINE | Facility: CLINIC | Age: 57
End: 2017-06-29
Payer: OTHER MISCELLANEOUS

## 2017-06-29 VITALS
DIASTOLIC BLOOD PRESSURE: 88 MMHG | BODY MASS INDEX: 21.56 KG/M2 | WEIGHT: 125.6 LBS | SYSTOLIC BLOOD PRESSURE: 138 MMHG | OXYGEN SATURATION: 100 % | HEART RATE: 80 BPM

## 2017-06-29 DIAGNOSIS — Z51.89 ENCOUNTER FOR WOUND CARE: Primary | ICD-10-CM

## 2017-06-29 PROCEDURE — 99213 OFFICE O/P EST LOW 20 MIN: CPT | Performed by: PHYSICIAN ASSISTANT

## 2017-06-29 NOTE — NURSING NOTE
"Chief Complaint   Patient presents with     Laceration       Initial /88  Pulse 80  Wt 125 lb 9.6 oz (57 kg)  SpO2 100%  BMI 21.56 kg/m2 Estimated body mass index is 21.56 kg/(m^2) as calculated from the following:    Height as of 6/15/17: 5' 4\" (1.626 m).    Weight as of this encounter: 125 lb 9.6 oz (57 kg).  Medication Reconciliation: complete  Loulou Broussard CMA    "

## 2017-06-29 NOTE — MR AVS SNAPSHOT
After Visit Summary   6/29/2017    Mariana Feliz    MRN: [id number]           Patient Information     Date Of Birth          1960        Visit Information        Provider Department      6/29/2017 11:20 AM Eric Carter PA-C [facility]         Follow-ups after your visit        Who to contact     If you have questions or need follow up information about today's clinic visit or your schedule please contact [facility] directly at [phone].  Normal or non-critical lab and imaging results will be communicated to you by MyChart, letter or phone within 4 business days after the clinic has received the results. If you do not hear from us within 7 days, please contact the clinic through [employer] or phone. If you have a critical or abnormal lab result, we will notify you by phone as soon as possible.  Submit refill requests through [facility] or call your pharmacy and they will forward the refill request to us. Please allow 3 business days for your refill to be completed.          Additional Information About Your Visit        MyChart Information     [facility] gives you secure access to your electronic health record. If you see a primary care provider, you can also send messages to your care team and make appointments. If you have questions, please call your primary care clinic.  If you do not have a primary care provider, please call [phone] and they will assist you.        Care EveryWhere ID     This is your Care EveryWhere ID. This could be used by other organizations to access your [place] medical records  [id number]        Your Vitals Were     Pulse Pulse Oximetry BMI (Body Mass Index)             80 100% 21.56 kg/m2          Blood Pressure from Last 3 Encounters:   06/29/17 138/88   06/15/17 130/78   05/25/17 149/89    Weight from Last 3 Encounters:   06/29/17 125 lb 9.6 oz (57 kg)   06/15/17 127 lb (57.6 kg)   04/28/17 124 lb (56.2 kg)              Today,  you had the following     No orders found for display       Primary Care Provider Office Phone # Fax #    Nilson Figueroa [phone]       [facility] [address] [facility] [address]        Equal Access to Services     GILMA DELVALLE : Hadii lorna ku hadabundioo Soomaali, waaxda luqadaha, qaybta kaalmada adeegyada, waxvicki lynettein haymarcosn yuli woodsprudenciorodolfo girard . So [facility] [phone].    ATENCIÓN: Si habla español, tiene a peter disposición servicios gratuitos de asistencia lingüística. Llame al [phone].    We comply with applicable federal civil rights laws and [place] laws. We do not discriminate on the basis of race, color, national origin, age, disability sex, sexual orientation or gender identity.            Thank you!     Thank you for choosing [facility]  for your care. Our goal is always to provide you with excellent care. Hearing back from our patients is one way we can continue to improve our services. Please take a few minutes to complete the written survey that you may receive in the mail after your visit with us. Thank you!             Your Updated Medication List - Protect others around you: Learn how to safely use, store and throw away your medicines at www.disposemymeds.org.          This list is accurate as of: 6/29/17 11:44 AM.  Always use your most recent med list.                   Brand Name Dispense Instructions for use Diagnosis    NO ACTIVE MEDICATIONS      .

## 2017-06-29 NOTE — PROGRESS NOTES
SUBJECTIVE:                                                    Mariana Feliz is a 57 year old female who presents to the clinic with a laceration on the left thumb sustained 3 days ago.  This is a work related and non-work related injury.  Mechanism of injury: .    Pt was seen at [facility] after incident and laceration was glued shut. Part of the glue came of so pt came in to get this reevaluated to see if anything else needs to be applied to the area  Care Everywhere Reviewed      Problem list and histories reviewed & adjusted, as indicated.  Additional history: as documented    Patient Active Problem List   Diagnosis     CARDIOVASCULAR SCREENING; LDL GOAL LESS THAN 160     Advanced directives, counseling/discussion     MVP (mitral valve prolapse)     Tobacco use disorder     Arthrosis of first carpometacarpal joint     Past Surgical History:   Procedure Laterality Date     DENTAL SURGERY      wisdom tooth extraction     ORTHOPEDIC SURGERY  2013    bunion surgery/ right foot       Social History   Substance Use Topics     Smoking status: Current Every Day Smoker     Packs/day: 1.00     Years: 20.00     Types: Cigarettes     Smokeless tobacco: Never Used     Alcohol use No     Family History   Problem Relation Age of Onset     Arthritis Mother      DIABETES Mother      Hypertension Mother      OSTEOPOROSIS Mother      Hyperlipidemia Mother      Hypertension Brother      DIABETES Sister      CANCER Son      Hypertension Brother      Thyroid Disease Brother      Thyroid Disease Sister      Hypertension Sister      Hyperlipidemia Sister      Asthma Sister          Current Outpatient Prescriptions   Medication Sig Dispense Refill     NO ACTIVE MEDICATIONS .       Allergies   Allergen Reactions     Nkda [No Known Drug Allergies]        Reviewed and updated as needed this visit by clinical staff  Tobacco  Allergies  Meds  Med Hx  Surg Hx  Fam Hx  Soc Hx      Reviewed and updated as needed this visit by  Provider         OBJECTIVE:     /88  Pulse 80  Wt 125 lb 9.6 oz (57 kg)  SpO2 100%  BMI 21.56 kg/m2  Body mass index is 21.56 kg/(m^2).  GENERAL: healthy, alert and no distress  Left distal thumb. Wound is healing well. Small area on distal aspect of wound that is slightly gapped open. No discharge. No signs of infection. full range of motion of thumb. Neurovascularly Intact Distally.      ASSESSMENT/PLAN:       ICD-10-CM    1. Encounter for wound care Z51.89      Patient reassurance.  warning signs discussed.   Keep wound clean  Follow up  1 wk as needed       Eric Carter PA-C  [facility]

## 2017-07-11 PROCEDURE — 82274 ASSAY TEST FOR BLOOD FECAL: CPT | Performed by: PHYSICIAN ASSISTANT

## 2017-07-12 DIAGNOSIS — Z12.11 SPECIAL SCREENING FOR MALIGNANT NEOPLASMS, COLON: ICD-10-CM

## 2017-07-12 LAB — HEMOCCULT STL QL IA: NEGATIVE

## 2017-07-12 NOTE — PROGRESS NOTES
Alberta Peña,       Your recent test results are attached, if you have any questions or concerns please feel free to contact me via e-mail or call [phone].  Occult blood test (FIT test) was normal.  No hidden blood was found in your stool.  This is a screening test for colon cancer and is recommended yearly.           It was a pleasure to see you at your recent office visit.      Sincerely,  Shelbie Merrill PA-C

## 2017-09-12 ENCOUNTER — MYC MEDICAL ADVICE (OUTPATIENT)
Dept: FAMILY MEDICINE | Facility: CLINIC | Age: 57
End: 2017-09-12

## 2017-09-13 ENCOUNTER — TELEPHONE (OUTPATIENT)
Dept: ORTHOPEDICS | Facility: CLINIC | Age: 57
End: 2017-09-13

## 2017-09-13 NOTE — TELEPHONE ENCOUNTER
Per protocol, will route encounter to be cosigned by provider for Verbal Orders.  Kailyn Laureano RN

## 2017-09-13 NOTE — TELEPHONE ENCOUNTER
I have discussed this patient's issue with the RN involved and we have come up with this plan.  Nilson Figueroa MD

## 2017-09-13 NOTE — TELEPHONE ENCOUNTER
[facility] Call Center    Phone Message    Name of Caller: Mariana    Phone Number: Home number on file [phone] (home)    Best time to return call: any    May a detailed message be left on voicemail: yes    Relation to patient: Self    Reason for Call: Patient is requesting ORder: Injection - R Thumb     Action Taken: Message routed to:  Adult Clinics: Orthopedics p [id number]

## 2017-09-14 NOTE — TELEPHONE ENCOUNTER
LOV: 5/25/17 for right 1st CMC OA, received steroid injection same OV.  Called Pt and she stated that she decided to go with a different provider that is closer to her.     Elijah Wisdom RN

## 2017-12-05 ENCOUNTER — OFFICE VISIT (OUTPATIENT)
Dept: FAMILY MEDICINE | Facility: CLINIC | Age: 57
End: 2017-12-05
Payer: COMMERCIAL

## 2017-12-05 VITALS
TEMPERATURE: 98.6 F | SYSTOLIC BLOOD PRESSURE: 139 MMHG | OXYGEN SATURATION: 96 % | WEIGHT: 126.4 LBS | HEIGHT: 63 IN | BODY MASS INDEX: 22.39 KG/M2 | DIASTOLIC BLOOD PRESSURE: 79 MMHG | HEART RATE: 86 BPM

## 2017-12-05 DIAGNOSIS — J20.9 ACUTE BRONCHITIS WITH SYMPTOMS > 10 DAYS: Primary | ICD-10-CM

## 2017-12-05 PROCEDURE — 99213 OFFICE O/P EST LOW 20 MIN: CPT | Performed by: PHYSICIAN ASSISTANT

## 2017-12-05 RX ORDER — BENZONATATE 100 MG/1
CAPSULE ORAL
COMMUNITY
Start: 2017-12-03

## 2017-12-05 RX ORDER — ALBUTEROL SULFATE 90 UG/1
AEROSOL, METERED RESPIRATORY (INHALATION)
COMMUNITY
Start: 2017-12-03

## 2017-12-05 RX ORDER — CODEINE PHOSPHATE AND GUAIFENESIN 10; 100 MG/5ML; MG/5ML
1-2 SOLUTION ORAL EVERY 4 HOURS PRN
Qty: 180 ML | Refills: 0 | Status: SHIPPED | OUTPATIENT
Start: 2017-12-05

## 2017-12-05 RX ORDER — INHALER, ASSIST DEVICES
SPACER (EA) MISCELLANEOUS
COMMUNITY
Start: 2017-12-03

## 2017-12-05 RX ORDER — AZITHROMYCIN 250 MG/1
TABLET, FILM COATED ORAL
Qty: 6 TABLET | Refills: 0 | Status: SHIPPED | OUTPATIENT
Start: 2017-12-05

## 2017-12-05 NOTE — NURSING NOTE
"Chief Complaint   Patient presents with     Cough       Initial /79  Pulse 86  Temp 98.6  F (37  C) (Oral)  Ht 5' 3\" (1.6 m)  Wt 126 lb 6.4 oz (57.3 kg)  SpO2 96%  BMI 22.39 kg/m2 Estimated body mass index is 22.39 kg/(m^2) as calculated from the following:    Height as of this encounter: 5' 3\" (1.6 m).    Weight as of this encounter: 126 lb 6.4 oz (57.3 kg).  Medication Reconciliation: complete    Ashley Gamino MA    "

## 2017-12-05 NOTE — PROGRESS NOTES
"  SUBJECTIVE:   Mariana Feliz is a 57 year old female who presents to clinic today for the following health issues:  ENT Symptoms             Symptoms: cc Present Absent Comment   Fever/Chills   x    Fatigue   x Last week   Muscle Aches   x Last week   Eye Irritation   x    Sneezing   x    Nasal Dada/Drg   x Last week    Sinus Pressure/Pain   x    Loss of smell   x    Dental pain   x    Sore Throat   x    Swollen Glands   x    Ear Pain/Fullness   x Was told pt had fluid behind ears at [facility] on Sunday   Cough x x  Dry, loose but non productive    Wheeze   x    Chest Pain   x    Shortness of breath   x    Rash   x    Other   x      Symptom duration:  1 week   Symptom severity:  severe in the middle of the night    Treatments tried:  robitussin DM, albuterol inhaler, tessalon   Contacts:  daughter - has flu           Allergies   Allergen Reactions     Nkda [No Known Drug Allergies]        Past Medical History:   Diagnosis Date     Arthritis     possibly the start of     MVP (mitral valve prolapse) 9/10/2013    Take medication pre procedures      NO ACTIVE PROBLEMS      Tobacco use disorder 9/10/2013         Current Outpatient Prescriptions on File Prior to Visit:  NO ACTIVE MEDICATIONS .     No current facility-administered medications on file prior to visit.     Social History   Substance Use Topics     Smoking status: Current Every Day Smoker     Packs/day: 1.00     Years: 20.00     Types: Cigarettes     Smokeless tobacco: Never Used     Alcohol use No       ROS:  CONSTITUTIONAL: Negative for fatigue or fever.  EYES: Negative for eye problems.  ENT: As above.  RESP: As above.  CV: Negative for chest pains.  GI: Negative for vomiting.  MUSCULOSKELETAL:  Negative for significant muscle or joint pains.  NEUROLOGIC: Negative for headaches.  SKIN: Negative for rash.    OBJECTIVE:  /79  Pulse 86  Temp 98.6  F (37  C) (Oral)  Ht 5' 3\" (1.6 m)  Wt 126 lb 6.4 oz (57.3 kg)  SpO2 96%  BMI 22.39 kg/m2  GENERAL " APPEARANCE: Healthy, alert and no distress.  EYES:Cconjunctiva/sclera clear.  EARS: No cerumen.   Ear canals w/o erythema.  TM's intact w/o erythema.    NOSE/MOUTH: Nose without ulcers, erythema or lesions.  SINUSES: No maxillary sinus tenderness.  THROAT: No erythema w/o tonsillar enlargement . No exudates.  NECK: Supple, nontender, no lymphadenopathy.  RESP: Lungs clear to auscultation - no rales, rhonchi or wheezes  CV: Regular rate and rhythm, normal S1 S2, no murmur noted.  NEURO: Awake, alert    SKIN: No rashes      ASSESSMENT:     ICD-10-CM    1. Acute bronchitis with symptoms > 10 days J20.9 azithromycin (ZITHROMAX Z-EFRA) 250 MG tablet     guaiFENesin-codeine (ROBITUSSIN AC) 100-10 MG/5ML SOLN solution       PLAN:  Lots of rest and fluids.  RTC if any worsening symptoms or if not improving.    Francisca Chand PA-C

## 2017-12-05 NOTE — MR AVS SNAPSHOT
"              After Visit Summary   12/5/2017    Mariana Feliz    MRN: [id number]           Patient Information     Date Of Birth          1960        Visit Information        Provider Department      12/5/2017 11:40 AM Francisca Chand PA-C [facility]        Today's Diagnoses     Acute bronchitis with symptoms > 10 days    -  1       Follow-ups after your visit        Who to contact     If you have questions or need follow up information about today's clinic visit or your schedule please contact [facility] directly at [phone].  Normal or non-critical lab and imaging results will be communicated to you by [employer], letter or phone within 4 business days after the clinic has received the results. If you do not hear from us within 7 days, please contact the clinic through [employer] or phone. If you have a critical or abnormal lab result, we will notify you by phone as soon as possible.  Submit refill requests through [employer] or call your pharmacy and they will forward the refill request to us. Please allow 3 business days for your refill to be completed.          Additional Information About Your Visit        MyChart Information     [employer] gives you secure access to your electronic health record. If you see a primary care provider, you can also send messages to your care team and make appointments. If you have questions, please call your primary care clinic.  If you do not have a primary care provider, please call [phone] and they will assist you.        Care EveryWhere ID     This is your Care EveryWhere ID. This could be used by other organizations to access your [place] medical records  [id number]        Your Vitals Were     Pulse Temperature Height Pulse Oximetry BMI (Body Mass Index)       86 98.6  F (37  C) (Oral) 5' 3\" (1.6 m) 96% 22.39 kg/m2        Blood Pressure from Last 3 Encounters:   12/05/17 139/79   06/29/17 138/88   06/15/17 130/78    Weight from Last 3 " Encounters:   12/05/17 126 lb 6.4 oz (57.3 kg)   06/29/17 125 lb 9.6 oz (57 kg)   06/15/17 127 lb (57.6 kg)              Today, you had the following     No orders found for display         Today's Medication Changes          These changes are accurate as of: 12/5/17 11:57 AM.  If you have any questions, ask your nurse or doctor.               Start taking these medicines.        Dose/Directions    azithromycin 250 MG tablet   Commonly known as:  ZITHROMAX Z-EFRA   Used for:  Acute bronchitis with symptoms > 10 days   Started by:  Francisca Chand PA-C        2 tabs day one then 1 tab qd   Quantity:  6 tablet   Refills:  0       guaiFENesin-codeine 100-10 MG/5ML Soln solution   Commonly known as:  ROBITUSSIN AC   Used for:  Acute bronchitis with symptoms > 10 days   Started by:  Francisca Chand PA-C        Dose:  1-2 tsp.   Take 5-10 mLs by mouth every 4 hours as needed for cough   Quantity:  180 mL   Refills:  0            Where to get your medicines      These medications were sent to [address], [address]  [address] [place] [id number]     Phone:  [phone]     azithromycin 250 MG tablet         Some of these will need a paper prescription and others can be bought over the counter.  Ask your nurse if you have questions.     Bring a paper prescription for each of these medications     guaiFENesin-codeine 100-10 MG/5ML Soln solution                Primary Care Provider Office Phone # Fax #    Nilson Figueroa [phone] [phone]        Equal Access to Services     GILMA DELVALLE : Hadjeanine barillas Sojessica, waaxda luqadaha, qaybta kaalmada adry rodriguez. So [facility] [phone].    ATENCIÓN: Si habla español, tiene a peter disposición servicios gratuitos de asistencia lingüística. Llame al [phone].    We comply with applicable federal civil rights  laws and [place] laws. We do not discriminate on the basis of race, color, national origin, age, disability, sex, sexual orientation, or gender identity.            Thank you!     [facility] you for [facility]. Our goal is always to provide you with excellent care. Hearing back from our patients is one way we can continue to improve our services. Please take a few minutes to complete the written survey that you may receive in the mail after your visit with us. Thank you!             Your Updated Medication List - Protect others around you: Learn how to safely use, store and throw away your medicines at www.disposemymeds.org.          This list is accurate as of: 12/5/17 11:57 AM.  Always use your most recent med list.                   Brand Name Dispense Instructions for use Diagnosis    azithromycin 250 MG tablet    ZITHROMAX Z-EFRA    6 tablet    2 tabs day one then 1 tab qd    Acute bronchitis with symptoms > 10 days       benzonatate 100 MG capsule    TESSALON          guaiFENesin-codeine 100-10 MG/5ML Soln solution    ROBITUSSIN AC    180 mL    Take 5-10 mLs by mouth every 4 hours as needed for cough    Acute bronchitis with symptoms > 10 days       NO ACTIVE MEDICATIONS      .        OPTICHAMBER SUJATHA Misc           PROAIR  (90 BASE) MCG/ACT Inhaler   Generic drug:  albuterol

## 2019-10-05 ENCOUNTER — HEALTH MAINTENANCE LETTER (OUTPATIENT)
Age: 59
End: 2019-10-05

## 2020-11-14 ENCOUNTER — HEALTH MAINTENANCE LETTER (OUTPATIENT)
Age: 60
End: 2020-11-14

## 2021-09-12 ENCOUNTER — HEALTH MAINTENANCE LETTER (OUTPATIENT)
Age: 61
End: 2021-09-12

## 2021-11-07 ENCOUNTER — HEALTH MAINTENANCE LETTER (OUTPATIENT)
Age: 61
End: 2021-11-07

## 2022-01-02 ENCOUNTER — HEALTH MAINTENANCE LETTER (OUTPATIENT)
Age: 62
End: 2022-01-02

## 2022-11-19 ENCOUNTER — HEALTH MAINTENANCE LETTER (OUTPATIENT)
Age: 62
End: 2022-11-19

## 2023-04-09 ENCOUNTER — HEALTH MAINTENANCE LETTER (OUTPATIENT)
Age: 63
End: 2023-04-09

## 2023-11-18 ENCOUNTER — HEALTH MAINTENANCE LETTER (OUTPATIENT)
Age: 63
End: 2023-11-18